# Patient Record
Sex: FEMALE | Race: WHITE | NOT HISPANIC OR LATINO
[De-identification: names, ages, dates, MRNs, and addresses within clinical notes are randomized per-mention and may not be internally consistent; named-entity substitution may affect disease eponyms.]

---

## 2017-02-17 PROBLEM — Z00.00 ENCOUNTER FOR PREVENTIVE HEALTH EXAMINATION: Status: ACTIVE | Noted: 2017-02-17

## 2017-03-24 ENCOUNTER — APPOINTMENT (OUTPATIENT)
Dept: ENDOCRINOLOGY | Facility: CLINIC | Age: 57
End: 2017-03-24

## 2017-03-24 VITALS
OXYGEN SATURATION: 98 % | SYSTOLIC BLOOD PRESSURE: 138 MMHG | WEIGHT: 110 LBS | HEART RATE: 74 BPM | DIASTOLIC BLOOD PRESSURE: 82 MMHG | HEIGHT: 58.2 IN | BODY MASS INDEX: 22.78 KG/M2

## 2017-03-24 DIAGNOSIS — Z82.62 FAMILY HISTORY OF OSTEOPOROSIS: ICD-10-CM

## 2017-03-24 RX ORDER — MULTIVITAMIN
TABLET ORAL
Refills: 0 | Status: ACTIVE | COMMUNITY

## 2017-03-24 RX ORDER — ACETAMINOPHEN 325 MG
5000 TABLET ORAL
Refills: 0 | Status: ACTIVE | COMMUNITY

## 2017-03-24 RX ORDER — MILK THISTLE FRUIT EXTRACT 140 MG
CAPSULE ORAL
Refills: 0 | Status: ACTIVE | COMMUNITY

## 2017-07-25 ENCOUNTER — APPOINTMENT (OUTPATIENT)
Dept: ENDOCRINOLOGY | Facility: CLINIC | Age: 57
End: 2017-07-25

## 2017-07-25 VITALS
WEIGHT: 111 LBS | BODY MASS INDEX: 23.3 KG/M2 | OXYGEN SATURATION: 98 % | DIASTOLIC BLOOD PRESSURE: 82 MMHG | HEART RATE: 68 BPM | HEIGHT: 58 IN | SYSTOLIC BLOOD PRESSURE: 130 MMHG

## 2017-07-26 LAB
ALBUMIN SERPL ELPH-MCNC: 4.8 G/DL
ALP BLD-CCNC: 61 U/L
ALT SERPL-CCNC: 11 U/L
ANION GAP SERPL CALC-SCNC: 14 MMOL/L
AST SERPL-CCNC: 16 U/L
BILIRUB SERPL-MCNC: 0.3 MG/DL
BUN SERPL-MCNC: 14 MG/DL
CALCIUM SERPL-MCNC: 10.3 MG/DL
CHLORIDE SERPL-SCNC: 100 MMOL/L
CO2 SERPL-SCNC: 29 MMOL/L
CREAT SERPL-MCNC: 0.66 MG/DL
GLUCOSE SERPL-MCNC: 94 MG/DL
POTASSIUM SERPL-SCNC: 4.6 MMOL/L
PROT SERPL-MCNC: 7 G/DL
SODIUM SERPL-SCNC: 143 MMOL/L

## 2017-08-14 ENCOUNTER — RX RENEWAL (OUTPATIENT)
Age: 57
End: 2017-08-14

## 2017-08-14 DIAGNOSIS — N95.2 POSTMENOPAUSAL ATROPHIC VAGINITIS: ICD-10-CM

## 2017-11-03 ENCOUNTER — LABORATORY RESULT (OUTPATIENT)
Age: 57
End: 2017-11-03

## 2017-11-03 ENCOUNTER — APPOINTMENT (OUTPATIENT)
Dept: OBGYN | Facility: CLINIC | Age: 57
End: 2017-11-03
Payer: COMMERCIAL

## 2017-11-03 VITALS
DIASTOLIC BLOOD PRESSURE: 79 MMHG | WEIGHT: 104 LBS | BODY MASS INDEX: 21.83 KG/M2 | HEART RATE: 71 BPM | HEIGHT: 58 IN | SYSTOLIC BLOOD PRESSURE: 139 MMHG

## 2017-11-03 DIAGNOSIS — N95.0 POSTMENOPAUSAL BLEEDING: ICD-10-CM

## 2017-11-03 DIAGNOSIS — R31.9 HEMATURIA, UNSPECIFIED: ICD-10-CM

## 2017-11-03 LAB
BLOOD URINE: NORMAL
KETONES URINE: NORMAL
LEUKOCYTE ESTERASE URINE: NORMAL
NITRITE URINE: NORMAL
PROTEIN URINE: NORMAL

## 2017-11-03 PROCEDURE — 76856 US EXAM PELVIC COMPLETE: CPT

## 2017-11-03 PROCEDURE — 99214 OFFICE O/P EST MOD 30 MIN: CPT | Mod: 25

## 2017-11-06 LAB — BACTERIA UR CULT: ABNORMAL

## 2017-12-01 ENCOUNTER — TRANSCRIPTION ENCOUNTER (OUTPATIENT)
Age: 57
End: 2017-12-01

## 2017-12-29 ENCOUNTER — MESSAGE (OUTPATIENT)
Age: 57
End: 2017-12-29

## 2018-01-03 ENCOUNTER — TRANSCRIPTION ENCOUNTER (OUTPATIENT)
Age: 58
End: 2018-01-03

## 2018-02-27 ENCOUNTER — RX RENEWAL (OUTPATIENT)
Age: 58
End: 2018-02-27

## 2018-03-06 ENCOUNTER — TRANSCRIPTION ENCOUNTER (OUTPATIENT)
Age: 58
End: 2018-03-06

## 2018-03-26 ENCOUNTER — APPOINTMENT (OUTPATIENT)
Dept: ENDOCRINOLOGY | Facility: CLINIC | Age: 58
End: 2018-03-26
Payer: COMMERCIAL

## 2018-03-26 VITALS — WEIGHT: 104 LBS | BODY MASS INDEX: 21.25 KG/M2 | HEIGHT: 58.5 IN

## 2018-03-26 PROCEDURE — 99214 OFFICE O/P EST MOD 30 MIN: CPT | Mod: 25

## 2018-03-26 PROCEDURE — ZZZZZ: CPT

## 2018-03-26 PROCEDURE — 77080 DXA BONE DENSITY AXIAL: CPT

## 2018-03-26 RX ORDER — RISEDRONATE SODIUM 129; 21 MG/1; MG/1
150 TABLET, FILM COATED ORAL
Refills: 0 | Status: DISCONTINUED | COMMUNITY
End: 2018-03-26

## 2018-03-26 RX ORDER — ESTRADIOL 10 UG/1
10 INSERT VAGINAL
Refills: 0 | Status: DISCONTINUED | COMMUNITY
End: 2018-03-26

## 2018-03-27 LAB
25(OH)D3 SERPL-MCNC: 90.7 NG/ML
ALBUMIN SERPL ELPH-MCNC: 4.5 G/DL
ALP BLD-CCNC: 63 U/L
ALT SERPL-CCNC: 12 U/L
ANION GAP SERPL CALC-SCNC: 11 MMOL/L
AST SERPL-CCNC: 16 U/L
BILIRUB SERPL-MCNC: 0.4 MG/DL
BUN SERPL-MCNC: 14 MG/DL
CALCIUM SERPL-MCNC: 9.4 MG/DL
CALCIUM SERPL-MCNC: 9.4 MG/DL
CHLORIDE SERPL-SCNC: 101 MMOL/L
CO2 SERPL-SCNC: 27 MMOL/L
CREAT SERPL-MCNC: 0.64 MG/DL
GLUCOSE SERPL-MCNC: 81 MG/DL
PARATHYROID HORMONE INTACT: 30 PG/ML
POTASSIUM SERPL-SCNC: 4.1 MMOL/L
PROT SERPL-MCNC: 7 G/DL
SODIUM SERPL-SCNC: 139 MMOL/L

## 2018-04-04 ENCOUNTER — MEDICATION RENEWAL (OUTPATIENT)
Age: 58
End: 2018-04-04

## 2018-04-23 ENCOUNTER — APPOINTMENT (OUTPATIENT)
Dept: ENDOCRINOLOGY | Facility: CLINIC | Age: 58
End: 2018-04-23
Payer: COMMERCIAL

## 2018-04-23 PROCEDURE — 96372 THER/PROPH/DIAG INJ SC/IM: CPT

## 2018-04-23 RX ORDER — DENOSUMAB 60 MG/ML
60 INJECTION SUBCUTANEOUS
Qty: 0 | Refills: 0 | Status: COMPLETED | OUTPATIENT
Start: 2018-04-23

## 2018-04-23 RX ADMIN — DENOSUMAB 0 MG/ML: 60 INJECTION SUBCUTANEOUS at 00:00

## 2018-10-03 ENCOUNTER — MEDICATION RENEWAL (OUTPATIENT)
Age: 58
End: 2018-10-03

## 2018-10-29 ENCOUNTER — APPOINTMENT (OUTPATIENT)
Dept: ENDOCRINOLOGY | Facility: CLINIC | Age: 58
End: 2018-10-29
Payer: COMMERCIAL

## 2018-10-29 PROCEDURE — 96401 CHEMO ANTI-NEOPL SQ/IM: CPT

## 2018-10-29 RX ADMIN — DENOSUMAB 0 MG/ML: 60 INJECTION SUBCUTANEOUS at 00:00

## 2018-10-31 RX ORDER — DENOSUMAB 60 MG/ML
60 INJECTION SUBCUTANEOUS
Qty: 1 | Refills: 0 | Status: COMPLETED | OUTPATIENT
Start: 2018-10-29

## 2018-12-10 ENCOUNTER — MEDICATION RENEWAL (OUTPATIENT)
Age: 58
End: 2018-12-10

## 2019-01-31 ENCOUNTER — RX RENEWAL (OUTPATIENT)
Age: 59
End: 2019-01-31

## 2019-03-18 ENCOUNTER — ASOB RESULT (OUTPATIENT)
Age: 59
End: 2019-03-18

## 2019-03-18 ENCOUNTER — APPOINTMENT (OUTPATIENT)
Dept: OBGYN | Facility: CLINIC | Age: 59
End: 2019-03-18
Payer: COMMERCIAL

## 2019-03-18 ENCOUNTER — APPOINTMENT (OUTPATIENT)
Age: 59
End: 2019-03-18
Payer: COMMERCIAL

## 2019-03-18 VITALS
HEIGHT: 58.5 IN | SYSTOLIC BLOOD PRESSURE: 136 MMHG | DIASTOLIC BLOOD PRESSURE: 80 MMHG | HEART RATE: 72 BPM | BODY MASS INDEX: 22.07 KG/M2 | WEIGHT: 108 LBS

## 2019-03-18 LAB
BILIRUB UR QL STRIP: NEGATIVE
CLARITY UR: CLEAR
COLLECTION METHOD: NORMAL
GLUCOSE UR-MCNC: NEGATIVE
HCG UR QL: 0.2 EU/DL
HGB UR QL STRIP.AUTO: NEGATIVE
KETONES UR-MCNC: NEGATIVE
LEUKOCYTE ESTERASE UR QL STRIP: NORMAL
NITRITE UR QL STRIP: NEGATIVE
PH UR STRIP: 5.5
PROT UR STRIP-MCNC: NEGATIVE
SP GR UR STRIP: 1.02

## 2019-03-18 PROCEDURE — 99396 PREV VISIT EST AGE 40-64: CPT

## 2019-03-18 PROCEDURE — 76857 US EXAM PELVIC LIMITED: CPT | Mod: 59

## 2019-03-18 PROCEDURE — 81002 URINALYSIS NONAUTO W/O SCOPE: CPT

## 2019-03-18 PROCEDURE — 76830 TRANSVAGINAL US NON-OB: CPT

## 2019-03-18 RX ORDER — RISEDRONATE SODIUM 150 MG/1
150 TABLET, FILM COATED ORAL
Qty: 3 | Refills: 3 | Status: DISCONTINUED | COMMUNITY
Start: 2017-03-24 | End: 2019-03-18

## 2019-03-18 RX ORDER — NITROFURANTOIN (MONOHYDRATE/MACROCRYSTALS) 25; 75 MG/1; MG/1
100 CAPSULE ORAL
Qty: 10 | Refills: 1 | Status: DISCONTINUED | COMMUNITY
Start: 2017-12-29 | End: 2019-03-18

## 2019-03-18 NOTE — HISTORY OF PRESENT ILLNESS
[Good] : being in good health [1 Year Ago] : 1 year ago [Healthy Diet] : a healthy diet [Regular Exercise] : regular exercise [Current] : risk screening reviewed and current [Postmenopausal] : is postmenopausal [Pregnancy History] : pregnancy history: [Up to Date] : up to date with ~his/her~ STD screening [Currently In Menopause] : currently in menopause [Sexually Active] : is sexually active [Menopause Age: ____] : age at menopause was [unfilled] [Monogamous] : is monogamous [Male ___] : [unfilled] male [NA] : N/A [Weight Concerns] : no concerns with her weight [Menstrual Problems] : reports normal menses [Fever] : no fever [Nausea] : no nausea [Vomiting] : no vomiting [Diarrhea] : no diarrhea [Vaginal Bleeding] : no vaginal bleeding [Pelvic Pressure] : no pelvic pressure [Dysuria] : no dysuria [FreeTextEntry8] : one episode of PMB. [FreeTextEntry9] : none [Hot Flashes] : no hot flashes [Night Sweats] : no night sweats [Vaginal Itching] : no vaginal itching [Dyspareunia] : no dyspareunia [Mood Changes] : no mood changes [Experiencing Menopausal Sxs] : not experiencing menopausal symptoms [Contraception] : does not use contraception

## 2019-03-18 NOTE — PHYSICAL EXAM
[Awake] : awake [Alert] : alert [Soft] : soft [Oriented x3] : oriented to person, place, and time [No Bleeding] : there was no active vaginal bleeding [Normal] : uterus [Uterine Adnexae] : were not tender and not enlarged [Acute Distress] : no acute distress [Mass] : no breast mass [Nipple Discharge] : no nipple discharge [Axillary LAD] : no axillary lymphadenopathy [Tender] : non tender

## 2019-03-20 LAB — HPV HIGH+LOW RISK DNA PNL CVX: NOT DETECTED

## 2019-03-25 LAB — CYTOLOGY CVX/VAG DOC THIN PREP: NORMAL

## 2019-03-26 ENCOUNTER — TRANSCRIPTION ENCOUNTER (OUTPATIENT)
Age: 59
End: 2019-03-26

## 2019-05-06 ENCOUNTER — APPOINTMENT (OUTPATIENT)
Dept: ENDOCRINOLOGY | Facility: CLINIC | Age: 59
End: 2019-05-06
Payer: COMMERCIAL

## 2019-05-06 VITALS
WEIGHT: 108 LBS | OXYGEN SATURATION: 98 % | HEIGHT: 58.5 IN | DIASTOLIC BLOOD PRESSURE: 74 MMHG | HEART RATE: 81 BPM | SYSTOLIC BLOOD PRESSURE: 130 MMHG | BODY MASS INDEX: 22.07 KG/M2

## 2019-05-06 PROCEDURE — ZZZZZ: CPT

## 2019-05-06 PROCEDURE — 96401 CHEMO ANTI-NEOPL SQ/IM: CPT

## 2019-05-06 PROCEDURE — 77080 DXA BONE DENSITY AXIAL: CPT

## 2019-05-06 PROCEDURE — 99214 OFFICE O/P EST MOD 30 MIN: CPT | Mod: 25

## 2019-05-06 RX ORDER — DENOSUMAB 60 MG/ML
60 INJECTION SUBCUTANEOUS
Qty: 1 | Refills: 0 | Status: COMPLETED | OUTPATIENT
Start: 2019-05-06

## 2019-05-06 RX ADMIN — DENOSUMAB 60 MG/ML: 60 INJECTION SUBCUTANEOUS at 00:00

## 2019-05-06 NOTE — ASSESSMENT
[Bisphosphonates] : The patient was instructed to take bisphosphonates on an empty stomach with a full glass of water,and wait at least 30 minutes before eating or lying down [FreeTextEntry1] : 59 year-old female with severe osteoporosis \par \par Pt presented with progressive bone loss at the spine. Began Actonel. Tolerated well  except c/o cough after each dose. Pt was advised that she is at increased risk for future fxs due to osteoporosis. Options of medical therapy were discussed in great detail. Pt requested Prolia. First dose April 2018. Taking correctly, tolerating well, no ONJ or thigh pain. No interval fx. c/o jaw pain for a few weeks, dental exam neg. BMD 5/2019 indicates stability in all sites. \par \par Of note, occ feels palpitations towards the end of the day, previously wore heart monitor to rule out Afib. Will visit Dr. Jimenez again. \par \par Can see continued increase with Prolia even out to 10 years. I would not recommend more aggressive therapy at this time to include Tymlos, Forteo, Tanner. Continue with Prolia [facility]. \par \par I request labs sent out today. f/u in 6 mons.  [Denosumab Therapy] : Risks  and benefits of denosumab therapy were discussed with the patient including eczema, cellulitis, osteonecrosis of the jaw and atypical femur fractures

## 2019-05-06 NOTE — PHYSICAL EXAM
[Alert] : alert [No Acute Distress] : no acute distress [Well Nourished] : well nourished [Well Developed] : well developed [EOMI] : extra ocular movement intact [Normal Sclera/Conjunctiva] : normal sclera/conjunctiva [No Proptosis] : no proptosis [Normal Oropharynx] : the oropharynx was normal [Thyroid Not Enlarged] : the thyroid was not enlarged [No Thyroid Nodules] : there were no palpable thyroid nodules [No Accessory Muscle Use] : no accessory muscle use [No Respiratory Distress] : no respiratory distress [Clear to Auscultation] : lungs were clear to auscultation bilaterally [Normal Rate] : heart rate was normal  [Normal S1, S2] : normal S1 and S2 [Regular Rhythm] : with a regular rhythm [Normal Bowel Sounds] : normal bowel sounds [Soft] : abdomen soft [Not Tender] : non-tender [Not Distended] : not distended [Post Cervical Nodes] : posterior cervical nodes [Anterior Cervical Nodes] : anterior cervical nodes [Axillary Nodes] : axillary nodes [No Spinal Tenderness] : no spinal tenderness [Normal] : normal and non tender [No Stigmata of Cushings Syndrome] : no stigmata of cushings syndrome [Spine Straight] : spine straight [Normal Gait] : normal gait [Normal Strength/Tone] : muscle strength and tone were normal [No Rash] : no rash [Normal Reflexes] : deep tendon reflexes were 2+ and symmetric [No Tremors] : no tremors [Oriented x3] : oriented to person, place, and time [Acanthosis Nigricans] : no acanthosis nigricans

## 2019-05-06 NOTE — PROCEDURE
[FreeTextEntry1] : Bone mineral density: 05/06/2019 \par Indication: vs. 2018, assess response to medication \par Spine: -3.5 osteoporosis, no significant change \par Total hip: -2.3 osteopenia, no significant change \par Femoral neck: -3.0 osteoporosis, no significant change \par Proximal radius: -2.0 osteopenia, no significant change \par \par Bone mineral density March 26, 2018\par Indication assess response to medication\par Spine -3.6, osteoporosis, prior,-4.0\par Total hip -2.3, osteopenia\par Femoral neck -2.7, osteoporosis, no significant change\par Right total hip -2.3, osteopenia\par Right femoral neck -2.9, osteoporosis\par Left proximal radius -2.4, osteopenia, no prior\par \par \par i

## 2019-05-06 NOTE — END OF VISIT
[FreeTextEntry3] : I, Mary Small, authored this note working as a medical scribe for Dr. Cross.  05/06/2019. 11:15AM. \par This note was authored by Mary Small working as medical scribe for me. I have reviewed, edited, and revised the note as needed. I am in agreement with the exam findings, imaging findings, and treatment plan.  Kleber Cross MD

## 2019-05-06 NOTE — HISTORY OF PRESENT ILLNESS
[Risedronate (Actonel)] : Risedronate [Prolia (Denosumab)] : Prolia (Denosumab) [Family History of Osteoporosis] : family history of osteoporosis [Regular Dental Follow-Up] : regular dental follow-up [FreeTextEntry1] : f/u 59 year-old female with osteoporosis\par \par Long h/o low bone density. Spine bone density 2007; -3.0. Repeat 2012:-3.4. Repeat  2017 spine -4.0 femoral neck -2.9, total hip -2.3. Pt had previously declined treatment for osteoporosis. No h/o fx. No history of unusual or secondary causes for osteoporosis Began Actonel monthly, took correctly. Had minor side effects, did not have with repeat doses. c/o cough after dose. Transitioned to Prolia April 2018. Taking correctly, tolerating well, no thigh pain. Last DDS within 6 mons. No ONJ. No interval fx. \par   [Disordered Eating] : no past or present history of disordered eating [Taking Steroids] : no past or present history of taking steroids [Kidney Stones] : no history of kidney stones [Family History of Hip Fracture] : no family history of hip fracture [Hyperparathyroidism] : no hyperparathyroidism [History of Radiation Therapy] : no history of radiation therapy [History of Blood Clots] : no history of blood clots [Previous Fragility Fracture] : no previous fragility fracture

## 2019-05-07 LAB
ALBUMIN SERPL ELPH-MCNC: 4.8 G/DL
ALP BLD-CCNC: 57 U/L
ALT SERPL-CCNC: 12 U/L
ANION GAP SERPL CALC-SCNC: 12 MMOL/L
AST SERPL-CCNC: 16 U/L
BILIRUB SERPL-MCNC: 0.3 MG/DL
BUN SERPL-MCNC: 19 MG/DL
CALCIUM SERPL-MCNC: 10.1 MG/DL
CHLORIDE SERPL-SCNC: 97 MMOL/L
CO2 SERPL-SCNC: 28 MMOL/L
CREAT SERPL-MCNC: 0.6 MG/DL
GLUCOSE SERPL-MCNC: 80 MG/DL
POTASSIUM SERPL-SCNC: 4.1 MMOL/L
PROT SERPL-MCNC: 6.9 G/DL
SODIUM SERPL-SCNC: 137 MMOL/L

## 2019-10-31 ENCOUNTER — MEDICATION RENEWAL (OUTPATIENT)
Age: 59
End: 2019-10-31

## 2019-10-31 RX ORDER — DENOSUMAB 60 MG/ML
60 INJECTION SUBCUTANEOUS
Qty: 1 | Refills: 1 | Status: DISCONTINUED | COMMUNITY
Start: 2018-04-04 | End: 2019-10-31

## 2019-11-18 ENCOUNTER — APPOINTMENT (OUTPATIENT)
Dept: ENDOCRINOLOGY | Facility: CLINIC | Age: 59
End: 2019-11-18
Payer: COMMERCIAL

## 2019-11-18 VITALS
SYSTOLIC BLOOD PRESSURE: 120 MMHG | DIASTOLIC BLOOD PRESSURE: 80 MMHG | WEIGHT: 108 LBS | BODY MASS INDEX: 22.07 KG/M2 | OXYGEN SATURATION: 98 % | HEART RATE: 75 BPM | HEIGHT: 58.5 IN

## 2019-11-18 PROCEDURE — 96401 CHEMO ANTI-NEOPL SQ/IM: CPT

## 2019-11-18 PROCEDURE — 99214 OFFICE O/P EST MOD 30 MIN: CPT | Mod: 25

## 2019-11-18 RX ORDER — DENOSUMAB 60 MG/ML
60 INJECTION SUBCUTANEOUS
Qty: 1 | Refills: 0 | Status: COMPLETED | OUTPATIENT
Start: 2019-11-18

## 2019-11-18 RX ADMIN — DENOSUMAB 0 MG/ML: 60 INJECTION SUBCUTANEOUS at 00:00

## 2019-11-18 NOTE — HISTORY OF PRESENT ILLNESS
[Risedronate (Actonel)] : Risedronate [Prolia (Denosumab)] : Prolia (Denosumab) [Family History of Osteoporosis] : family history of osteoporosis [Regular Dental Follow-Up] : regular dental follow-up [FreeTextEntry1] : f/u 59 year-old female with osteoporosis\par \par Long h/o low bone density. Spine bone density 2007; -3.0. Repeat 2012:-3.4. Repeat  2017 spine -4.0 femoral neck -2.9, total hip -2.3. Pt had previously declined treatment for osteoporosis. No h/o fx. No history of unusual or secondary causes for osteoporosis Began Actonel monthly in 2017, took correctly. Had minor side effects, did not have with repeat doses. c/o cough after dose. Transitioned to Prolia April 2018. Taking correctly, tolerating well, no thigh pain. Last DDS within 6 mons. No ONJ. No interval fx. Last bone density from May 2019 showed osteoporosis in spine -3.5 and femoral neck -3.0 , osteopenia of total hip -2.3 and distal radius -2.0; no significant change. \par \par Had recent blood work with PCP: Dr. Lauro Cantrell and it was normal as per patient.  [Disordered Eating] : no past or present history of disordered eating [Taking Steroids] : no past or present history of taking steroids [Family History of Hip Fracture] : no family history of hip fracture [Kidney Stones] : no history of kidney stones [History of Radiation Therapy] : no history of radiation therapy [Hyperparathyroidism] : no hyperparathyroidism [History of Blood Clots] : no history of blood clots [Previous Fragility Fracture] : no previous fragility fracture

## 2019-11-18 NOTE — ASSESSMENT
[Denosumab Therapy] : Risks  and benefits of denosumab therapy were discussed with the patient including eczema, cellulitis, osteonecrosis of the jaw and atypical femur fractures [Bisphosphonates] : The patient was instructed to take bisphosphonates on an empty stomach with a full glass of water,and wait at least 30 minutes before eating or lying down [FreeTextEntry1] : 59 year-old female with severe osteoporosis \par \par Pt presented with progressive bone loss at the spine. Began Actonel. Tolerated well  except c/o cough after each dose. Pt was advised that she is at increased risk for future fxs due to osteoporosis. Options of medical therapy were discussed in great detail. Pt requested Prolia. First dose April 2018. Taking correctly, tolerating well, no ONJ or thigh pain. No interval fx. BMD 5/2019 indicates stability in all sites. \par \par Can see continued increase with Prolia even out to 10 years. I would not recommend more aggressive therapy at this time to include Tymlos, Forteo, Tanner. \par Continue with [facility]. \par \par f/u in 6 mons. \par \par Plan discussed with Dr. Cross.

## 2019-11-18 NOTE — PHYSICAL EXAM
[Alert] : alert [No Acute Distress] : no acute distress [Well Nourished] : well nourished [Well Developed] : well developed [Normal Sclera/Conjunctiva] : normal sclera/conjunctiva [Normal Oropharynx] : the oropharynx was normal [Thyroid Not Enlarged] : the thyroid was not enlarged [No Thyroid Nodules] : there were no palpable thyroid nodules [No Respiratory Distress] : no respiratory distress [No Accessory Muscle Use] : no accessory muscle use [Clear to Auscultation] : lungs were clear to auscultation bilaterally [Normal Rate] : heart rate was normal  [Normal S1, S2] : normal S1 and S2 [Regular Rhythm] : with a regular rhythm [Normal Bowel Sounds] : normal bowel sounds [Not Tender] : non-tender [Soft] : abdomen soft [Not Distended] : not distended [Anterior Cervical Nodes] : anterior cervical nodes [Normal] : normal and non tender [No Spinal Tenderness] : no spinal tenderness [Spine Straight] : spine straight [No Stigmata of Cushings Syndrome] : no stigmata of cushings syndrome [Normal Gait] : normal gait [No Rash] : no rash [No Tremors] : no tremors [Oriented x3] : oriented to person, place, and time [Normal Hearing] : hearing was normal [Post Cervical Nodes] : posterior cervical nodes [No Motor Deficits] : the motor exam was normal [Acanthosis Nigricans] : no acanthosis nigricans

## 2019-11-18 NOTE — REVIEW OF SYSTEMS
[Negative] : Cardiovascular [Fatigue] : no fatigue [Recent Weight Gain (___ Lbs)] : no recent weight gain [Chest Pain] : no chest pain [Recent Weight Loss (___ Lbs)] : no recent weight loss [Palpitations] : no palpitations [Shortness Of Breath] : no shortness of breath [Cough] : no cough [Vomiting] : no vomiting was observed [Nausea] : no nausea [Constipation] : no constipation [Abdominal Pain] : no abdominal pain [Diarrhea] : no diarrhea

## 2020-06-01 ENCOUNTER — APPOINTMENT (OUTPATIENT)
Dept: ENDOCRINOLOGY | Facility: CLINIC | Age: 60
End: 2020-06-01
Payer: COMMERCIAL

## 2020-06-01 VITALS
BODY MASS INDEX: 21.86 KG/M2 | DIASTOLIC BLOOD PRESSURE: 80 MMHG | HEART RATE: 83 BPM | WEIGHT: 107 LBS | SYSTOLIC BLOOD PRESSURE: 130 MMHG | OXYGEN SATURATION: 98 % | TEMPERATURE: 98.2 F | HEIGHT: 58.5 IN

## 2020-06-01 PROCEDURE — 96401 CHEMO ANTI-NEOPL SQ/IM: CPT

## 2020-06-01 PROCEDURE — 99213 OFFICE O/P EST LOW 20 MIN: CPT | Mod: 25

## 2020-06-01 RX ORDER — DENOSUMAB 60 MG/ML
60 INJECTION SUBCUTANEOUS
Qty: 1 | Refills: 0 | Status: COMPLETED | OUTPATIENT
Start: 2020-06-01

## 2020-06-01 RX ADMIN — DENOSUMAB 60 MG/ML: 60 INJECTION SUBCUTANEOUS at 00:00

## 2020-06-01 NOTE — PHYSICAL EXAM
[Alert] : alert [Well Nourished] : well nourished [Well Developed] : well developed [Normal Sclera/Conjunctiva] : normal sclera/conjunctiva [No Neck Mass] : no neck mass was observed [PERRL] : pupils equal, round and reactive to light [Supple] : the neck was supple [No Respiratory Distress] : no respiratory distress [Clear to Auscultation] : lungs were clear to auscultation bilaterally [Normal to Percussion] : lungs were normal to percussion [Normal S1, S2] : normal S1 and S2 [Normal Rate] : heart rate was normal [Normal Bowel Sounds] : normal bowel sounds [Regular Rhythm] : with a regular rhythm [Not Tender] : non-tender [Soft] : abdomen soft [No Stigmata of Cushings Syndrome] : no stigmata of Cushings Syndrome [Normal Gait] : normal gait [No Rash] : no rash [Normal Strength/Tone] : muscle strength and tone were normal [No Skin Lesions] : no skin lesions [No Motor Deficits] : the motor exam was normal [No Tremors] : no tremors [Oriented x3] : oriented to person, place, and time [Normal Mood] : the mood was normal [Normal Affect] : the affect was normal

## 2020-06-02 LAB
ALBUMIN SERPL ELPH-MCNC: 4.9 G/DL
ALP BLD-CCNC: 56 U/L
ALT SERPL-CCNC: 13 U/L
ANION GAP SERPL CALC-SCNC: 13 MMOL/L
AST SERPL-CCNC: 16 U/L
BILIRUB SERPL-MCNC: 0.3 MG/DL
BUN SERPL-MCNC: 11 MG/DL
CALCIUM SERPL-MCNC: 9.9 MG/DL
CHLORIDE SERPL-SCNC: 101 MMOL/L
CO2 SERPL-SCNC: 26 MMOL/L
CREAT SERPL-MCNC: 0.61 MG/DL
GLUCOSE SERPL-MCNC: 90 MG/DL
POTASSIUM SERPL-SCNC: 4.6 MMOL/L
PROT SERPL-MCNC: 6.7 G/DL
SODIUM SERPL-SCNC: 140 MMOL/L

## 2020-06-02 NOTE — HISTORY OF PRESENT ILLNESS
[Risedronate (Actonel)] : Risedronate [Prolia (Denosumab)] : Prolia (Denosumab) [Family History of Osteoporosis] : family history of osteoporosis [Regular Dental Follow-Up] : regular dental follow-up [Disordered Eating] : no past or present history of disordered eating [FreeTextEntry1] :  \par Long h/o low bone density. Spine bone density 2007; -3.0. Repeat 2012:-3.4. Repeat  2017 spine -4.0 femoral neck -2.9, total hip -2.3. Pt had previously declined treatment for osteoporosis. No h/o fx. No history of unusual or secondary causes for osteoporosis Began Actonel monthly in 2017, took correctly. Had minor side effects, did not have with repeat doses. c/o cough after dose. Transitioned to Prolia April 2018. Taking correctly, tolerating well, no thigh pain. Last DDS 2/2020, normal and no dental work planned. No ONJ. No interval fx. \par Last bone density from May 2019 showed osteoporosis in spine -3.5 and femoral neck -3.0 , osteopenia of total hip -2.3 and distal radius -2.0; no significant change. \par \par Had blood work with PCP: 11/2019 Dr. Lauro Cantrell [Kidney Stones] : no history of kidney stones [Taking Steroids] : no past or present history of taking steroids [Family History of Hip Fracture] : no family history of hip fracture [Hyperparathyroidism] : no hyperparathyroidism [History of Radiation Therapy] : no history of radiation therapy [History of Blood Clots] : no history of blood clots [Previous Fragility Fracture] : no previous fragility fracture

## 2020-06-02 NOTE — ASSESSMENT
[Denosumab Therapy] : Risks  and benefits of denosumab therapy were discussed with the patient including eczema, cellulitis, osteonecrosis of the jaw and atypical femur fractures [Bisphosphonates] : The patient was instructed to take bisphosphonates on an empty stomach with a full glass of water,and wait at least 30 minutes before eating or lying down [FreeTextEntry1] : 60 year-old female with severe osteoporosis \par \par Pt presented with progressive bone loss at the spine. Began Actonel. Tolerated well  except c/o cough after each dose. Pt was advised that she is at increased risk for future fxs due to osteoporosis. Options of medical therapy were discussed in great detail. Pt requested Prolia. First dose April 2018. Taking correctly, tolerating well, no ONJ or thigh pain. No interval fx. BMD 5/2019 indicates stability in all sites. Repeat one in 5/2021. \par  \par Continue with [facility]. \par Labs:	    [ ]      Today		    [ ] Date _________________\par  [     } give lab slip.      {     } am fasting \par Referral Prescription for:\par   [ ] Ultrasound   \par other radiology     \par Return to doctor in:   [ ] 4 weeks   [ ]  3 months   [ ]   4 months   [x ]    6 months   [ ]    1 year\par Return Appointment for:\par [x ]Prolia     \par  \par [ x] Bone Density/DEXA        [ ]  Next available        [ ]    1 month        [ x] Next visit      [ ]    ___________\par \par Plan discussed with Dr. Cross.

## 2020-06-02 NOTE — REVIEW OF SYSTEMS
[Negative] : Constitutional [All other systems negative] : All other systems negative [Neck Pain] : no neck pain [Dysphagia] : no dysphagia [Chest Pain] : no chest pain [Dysphonia] : no dysphonia [Palpitations] : no palpitations [Cough] : no cough [Shortness Of Breath] : no shortness of breath [Nausea] : no nausea [Constipation] : no constipation [Vomiting] : no vomiting [Polyuria] : no polyuria [Diarrhea] : no diarrhea [Dysuria] : no dysuria [Dizziness] : no dizziness [Headaches] : no headaches [Depression] : no depression [Tremors] : no tremors [Heat Intolerance] : no heat intolerance [Cold Intolerance] : no cold intolerance [Anxiety] : no anxiety

## 2020-06-02 NOTE — REASON FOR VISIT
[Follow-Up: _____] : a [unfilled] follow-up visit [Follow - Up] : a follow-up visit [Osteoporosis] : osteoporosis

## 2020-10-12 ENCOUNTER — APPOINTMENT (OUTPATIENT)
Dept: OBGYN | Facility: CLINIC | Age: 60
End: 2020-10-12
Payer: COMMERCIAL

## 2020-10-12 ENCOUNTER — APPOINTMENT (OUTPATIENT)
Dept: ANTEPARTUM | Facility: CLINIC | Age: 60
End: 2020-10-12

## 2020-10-12 VITALS
HEART RATE: 79 BPM | WEIGHT: 107 LBS | DIASTOLIC BLOOD PRESSURE: 82 MMHG | BODY MASS INDEX: 21.86 KG/M2 | HEIGHT: 58.5 IN | SYSTOLIC BLOOD PRESSURE: 160 MMHG

## 2020-10-12 DIAGNOSIS — N60.19 DIFFUSE CYSTIC MASTOPATHY OF UNSPECIFIED BREAST: ICD-10-CM

## 2020-10-12 LAB
BLOOD URINE: NORMAL
GLUCOSE QUALITATIVE U: NORMAL
KETONES URINE: NORMAL
LEUKOCYTE ESTERASE URINE: NORMAL
NITRITE URINE: NORMAL
PROTEIN URINE: NORMAL

## 2020-10-12 PROCEDURE — 99396 PREV VISIT EST AGE 40-64: CPT

## 2020-10-12 RX ORDER — NEOMYCIN AND POLYMYXIN B SULFATES AND DEXAMETHASONE 3.5; 10000; 1 MG/G; [IU]/G; MG/G
3.5-10000-0.1 OINTMENT OPHTHALMIC
Qty: 4 | Refills: 0 | Status: COMPLETED | COMMUNITY
Start: 2020-05-19

## 2020-10-12 RX ORDER — SODIUM PICOSULFATE, MAGNESIUM OXIDE, AND ANHYDROUS CITRIC ACID 10; 3.5; 12 MG/160ML; G/160ML; G/160ML
10-3.5-12 MG-GM LIQUID ORAL
Qty: 320 | Refills: 0 | Status: ACTIVE | COMMUNITY
Start: 2020-06-11

## 2020-10-12 NOTE — HISTORY OF PRESENT ILLNESS
[FreeTextEntry1] : Patient with history of PMB.\par Also with Osteoporosis. [Patient reported mammogram was normal] : Patient reported mammogram was normal [Patient reported breast sonogram was normal] : Patient reported breast sonogram was normal [Patient reported PAP Smear was normal] : Patient reported PAP Smear was normal [Patient reported bone density results were abnormal] : Patient reported bone density results were abnormal [HIV test declined] : HIV test declined [Syphilis test declined] : Syphilis test declined [Gonorrhea test declined] : Gonorrhea test declined [Chlamydia test declined] : Chlamydia test declined [Trichomonas test declined] : Trichomonas test declined [HPV test declined] : HPV test declined [Hepatitis B test declined] : Hepatitis B test declined [Hepatitis C test declined] : Hepatitis C test declined [postmenopausal] : postmenopausal [N] : Patient reports normal menses [Y] : Positive pregnancy history [TextBox_4] : Doing well.\par No complaints. [Mammogramdate] : 08/19 [BreastSonogramDate] : 08/19 [PapSmeardate] : 03/19 [BoneDensityDate] : 05/19 [TextBox_37] : osteoporosis. [TextBox_7] : none [TextBox_11] : post menopausal. [Diffused Pain] : diffused pain [TextBox_17] : fibrocystic. [unknown] : Patient is unsure of the date of her LMP [Currently Active] : currently active [Men] : men [Vaginal] : vaginal [No] : No [Patient refuses STI testing] : Patient refuses STI testing

## 2020-10-12 NOTE — DISCUSSION/SUMMARY
[FreeTextEntry1] : Patient doing well.\par Pap.\par Mammo/sono ordered.\par Under care of RENETTA Cross for Osteoporosis on meds.\par \par Routine follow up.

## 2020-10-12 NOTE — PHYSICAL EXAM
[Appropriately responsive] : appropriately responsive [Alert] : alert [No Acute Distress] : no acute distress [No Lymphadenopathy] : no lymphadenopathy [Regular Rate Rhythm] : regular rate rhythm [No Murmurs] : no murmurs [Clear to Auscultation B/L] : clear to auscultation bilaterally [Soft] : soft [Non-tender] : non-tender [Non-distended] : non-distended [No HSM] : No HSM [No Lesions] : no lesions [No Mass] : no mass [Oriented x3] : oriented x3 [Examination Of The Breasts] : a normal appearance [Breast Palpation Diffuse Fibrous Tissue Bilateral] : fibrocystic changes [No Masses] : no breast masses were palpable [Labia Majora] : normal [Labia Minora] : normal [Normal] : normal [Uterine Adnexae] : normal

## 2020-10-13 LAB — HPV HIGH+LOW RISK DNA PNL CVX: NOT DETECTED

## 2020-10-19 LAB — CYTOLOGY CVX/VAG DOC THIN PREP: ABNORMAL

## 2020-11-17 ENCOUNTER — RX RENEWAL (OUTPATIENT)
Age: 60
End: 2020-11-17

## 2020-12-07 ENCOUNTER — APPOINTMENT (OUTPATIENT)
Dept: ENDOCRINOLOGY | Facility: CLINIC | Age: 60
End: 2020-12-07
Payer: COMMERCIAL

## 2020-12-07 VITALS
HEART RATE: 82 BPM | TEMPERATURE: 98.3 F | BODY MASS INDEX: 21.66 KG/M2 | WEIGHT: 106 LBS | HEIGHT: 58.6 IN | OXYGEN SATURATION: 98 % | SYSTOLIC BLOOD PRESSURE: 150 MMHG | DIASTOLIC BLOOD PRESSURE: 72 MMHG

## 2020-12-07 PROCEDURE — ZZZZZ: CPT

## 2020-12-07 PROCEDURE — 99213 OFFICE O/P EST LOW 20 MIN: CPT | Mod: 25

## 2020-12-07 PROCEDURE — 77080 DXA BONE DENSITY AXIAL: CPT

## 2020-12-07 PROCEDURE — 99072 ADDL SUPL MATRL&STAF TM PHE: CPT

## 2020-12-07 PROCEDURE — 96401 CHEMO ANTI-NEOPL SQ/IM: CPT

## 2020-12-07 RX ORDER — MULTIVIT-MIN/FOLIC/VIT K/LYCOP 400-300MCG
25 MCG TABLET ORAL DAILY
Refills: 0 | Status: ACTIVE | COMMUNITY
Start: 2020-12-07

## 2020-12-07 RX ORDER — DENOSUMAB 60 MG/ML
60 INJECTION SUBCUTANEOUS
Qty: 1 | Refills: 0 | Status: COMPLETED | OUTPATIENT
Start: 2020-12-07

## 2020-12-07 RX ADMIN — DENOSUMAB 60 MG/ML: 60 INJECTION SUBCUTANEOUS at 00:00

## 2020-12-07 NOTE — PHYSICAL EXAM
[Alert] : alert [Well Nourished] : well nourished [Well Developed] : well developed [Normal Sclera/Conjunctiva] : normal sclera/conjunctiva [PERRL] : pupils equal, round and reactive to light [No Neck Mass] : no neck mass was observed [Supple] : the neck was supple [Clear to Auscultation] : lungs were clear to auscultation bilaterally [Normal to Percussion] : lungs were normal to percussion [Normal S1, S2] : normal S1 and S2 [Normal Rate] : heart rate was normal [Regular Rhythm] : with a regular rhythm [Normal Bowel Sounds] : normal bowel sounds [Not Tender] : non-tender [Soft] : abdomen soft [No Stigmata of Cushings Syndrome] : no stigmata of Cushings Syndrome [Normal Gait] : normal gait [Normal Strength/Tone] : muscle strength and tone were normal [No Rash] : no rash [No Skin Lesions] : no skin lesions [No Motor Deficits] : the motor exam was normal [No Tremors] : no tremors [Oriented x3] : oriented to person, place, and time [Normal Affect] : the affect was normal [Normal Mood] : the mood was normal

## 2020-12-08 LAB
25(OH)D3 SERPL-MCNC: 73.6 NG/ML
ALBUMIN SERPL ELPH-MCNC: 4.9 G/DL
ALP BLD-CCNC: 65 U/L
ALT SERPL-CCNC: 13 U/L
ANION GAP SERPL CALC-SCNC: 10 MMOL/L
AST SERPL-CCNC: 17 U/L
BILIRUB SERPL-MCNC: 0.4 MG/DL
BUN SERPL-MCNC: 10 MG/DL
CALCIUM SERPL-MCNC: 10.2 MG/DL
CALCIUM SERPL-MCNC: 10.2 MG/DL
CHLORIDE SERPL-SCNC: 100 MMOL/L
CO2 SERPL-SCNC: 28 MMOL/L
CREAT SERPL-MCNC: 0.65 MG/DL
GLUCOSE SERPL-MCNC: 88 MG/DL
PARATHYROID HORMONE INTACT: 23 PG/ML
PHOSPHATE SERPL-MCNC: 3.9 MG/DL
POTASSIUM SERPL-SCNC: 4.1 MMOL/L
PROT SERPL-MCNC: 7.3 G/DL
SODIUM SERPL-SCNC: 138 MMOL/L

## 2020-12-08 NOTE — REVIEW OF SYSTEMS
[All other systems negative] : All other systems negative [Negative] : Endocrine [Dysphagia] : no dysphagia [Neck Pain] : no neck pain [Dysphonia] : no dysphonia [Chest Pain] : no chest pain [Palpitations] : no palpitations [Shortness Of Breath] : no shortness of breath [Cough] : no cough [Polyuria] : no polyuria [Dysuria] : no dysuria [Dizziness] : no dizziness

## 2020-12-08 NOTE — ASSESSMENT
[Denosumab Therapy] : Risks  and benefits of denosumab therapy were discussed with the patient including eczema, cellulitis, osteonecrosis of the jaw and atypical femur fractures [Bisphosphonates] : The patient was instructed to take bisphosphonates on an empty stomach with a full glass of water,and wait at least 30 minutes before eating or lying down [FreeTextEntry1] : 60 year-old female with severe osteoporosis \par \par Pt presented with progressive bone loss at the spine. Began Actonel. Tolerated well  except c/o cough after each dose. Pt was advised that she is at increased risk for future fxs due to osteoporosis. Options of medical therapy were discussed in great detail. Pt requested Prolia. First dose April 2018. Taking correctly, tolerating well, no ONJ or thigh pain. No interval fx. BMD 5/2019 indicates stability in all sites. BMD 12/7/2020 shows improvement in spine and stability at hip, however there is significant bone loss at prox radius. Unclear reason for significant bone loss at radius. Will rule out hyperparathyroidism - will check PTH level, CMP, phosphorus. Will check CTx level.  Options of therapy reviewed.  Anabolic therapy such as Forteo would be unlikely to improve proximal radius bone density.\par Continue with Prolia [facility]. \par Repeat BMD at radius in 6 months.\par \par Plan discussed with Dr. Cross.

## 2020-12-08 NOTE — PROCEDURE
[FreeTextEntry1] : Bone density 12/7/2020\par indication: vs 2019\par spine -3.1 osteoporosis, +5.1%\par total hip -2.3, osteopenia, no significant change\par femoral neck -2.9 osteoporosis, no significant change\par distal radius -3.0, osteoporosis, -9.8%\par \par Bone mineral density: 05/06/2019 \par Indication: vs. 2018, assess response to medication \par Spine: -3.5 osteoporosis, no significant change \par Total hip: -2.3 osteopenia, no significant change \par Femoral neck: -3.0 osteoporosis, no significant change \par Proximal radius: -2.0 osteopenia, no significant change \par \par Bone mineral density March 26, 2018\par Indication assess response to medication\par Spine -3.6, osteoporosis, prior,-4.0\par Total hip -2.3, osteopenia\par Femoral neck -2.7, osteoporosis, no significant change\par Right total hip -2.3, osteopenia\par Right femoral neck -2.9, osteoporosis\par Left proximal radius -2.4, osteopenia, no prior\par \par \par i

## 2020-12-08 NOTE — HISTORY OF PRESENT ILLNESS
[Risedronate (Actonel)] : Risedronate [Prolia (Denosumab)] : Prolia (Denosumab) [Family History of Osteoporosis] : family history of osteoporosis [Regular Dental Follow-Up] : regular dental follow-up [FreeTextEntry1] : Long h/o low bone density. Spine bone density 2007; -3.0. Repeat 2012:-3.4. Repeat  2017 spine -4.0 femoral neck -2.9, total hip -2.3. Pt had previously declined treatment for osteoporosis. No h/o fx. No history of unusual or secondary causes for osteoporosis. Began Actonel monthly in 2017, took correctly. Had minor side effects, did not have with repeat doses. c/o cough after dose. Transitioned to Prolia April 2018. Taking correctly, tolerating well, no thigh pain. Last DDS 2/2020, normal and no dental work planned. No ONJ. No interval fx. Takes vitamin D 1,000 IU daily. Gets calcium in diet. \par Bone density May 2019 showed osteoporosis in spine -3.5 and femoral neck -3.0 , osteopenia of total hip -2.3 and distal radius -2.0; no significant change. \par Bone density 12/7/2020 shows improvement in spine -3.1, stable femoral neck -2.9 , stable total hip -2.3 and significant decrease in distal radius -3.0\par  [Disordered Eating] : no past or present history of disordered eating [Taking Steroids] : no past or present history of taking steroids [Kidney Stones] : no history of kidney stones [Family History of Hip Fracture] : no family history of hip fracture [Hyperparathyroidism] : no hyperparathyroidism [History of Radiation Therapy] : no history of radiation therapy [History of Blood Clots] : no history of blood clots [Previous Fragility Fracture] : no previous fragility fracture

## 2020-12-10 LAB — COLLAGEN CTX SERPL-MCNC: 52 PG/ML

## 2021-03-15 DIAGNOSIS — N39.0 URINARY TRACT INFECTION, SITE NOT SPECIFIED: ICD-10-CM

## 2021-05-25 ENCOUNTER — NON-APPOINTMENT (OUTPATIENT)
Age: 61
End: 2021-05-25

## 2021-06-14 ENCOUNTER — APPOINTMENT (OUTPATIENT)
Dept: ENDOCRINOLOGY | Facility: CLINIC | Age: 61
End: 2021-06-14
Payer: COMMERCIAL

## 2021-06-14 VITALS
DIASTOLIC BLOOD PRESSURE: 62 MMHG | HEIGHT: 58 IN | SYSTOLIC BLOOD PRESSURE: 98 MMHG | BODY MASS INDEX: 22.46 KG/M2 | OXYGEN SATURATION: 99 % | TEMPERATURE: 98.1 F | HEART RATE: 78 BPM | WEIGHT: 107 LBS

## 2021-06-14 DIAGNOSIS — I10 ESSENTIAL (PRIMARY) HYPERTENSION: ICD-10-CM

## 2021-06-14 PROCEDURE — 96401 CHEMO ANTI-NEOPL SQ/IM: CPT

## 2021-06-14 PROCEDURE — 99072 ADDL SUPL MATRL&STAF TM PHE: CPT

## 2021-06-14 PROCEDURE — 99214 OFFICE O/P EST MOD 30 MIN: CPT | Mod: 25

## 2021-06-14 RX ORDER — DENOSUMAB 60 MG/ML
60 INJECTION SUBCUTANEOUS
Qty: 1 | Refills: 0 | Status: COMPLETED | OUTPATIENT
Start: 2021-06-14

## 2021-06-14 RX ADMIN — DENOSUMAB 60 MG/ML: 60 INJECTION SUBCUTANEOUS at 00:00

## 2021-06-14 NOTE — REVIEW OF SYSTEMS
[Negative] : Endocrine [All other systems negative] : All other systems negative [Dysphagia] : no dysphagia [Neck Pain] : no neck pain [Dysphonia] : no dysphonia [Chest Pain] : no chest pain [Palpitations] : no palpitations [Shortness Of Breath] : no shortness of breath [Cough] : no cough [Polyuria] : no polyuria [Dysuria] : no dysuria [Dizziness] : no dizziness

## 2021-06-14 NOTE — HISTORY OF PRESENT ILLNESS
[Risedronate (Actonel)] : Risedronate [Prolia (Denosumab)] : Prolia (Denosumab) [Family History of Osteoporosis] : family history of osteoporosis [Regular Dental Follow-Up] : regular dental follow-up [FreeTextEntry1] : Long h/o low bone density. Spine bone density 2007; -3.0. Repeat 2012:-3.4. Repeat  2017 spine -4.0 femoral neck -2.9, total hip -2.3. Pt had previously declined treatment for osteoporosis. No h/o fx. No history of unusual or secondary causes for osteoporosis. Began Actonel monthly in 2017, took correctly. Had minor side effects, did not have with repeat doses. c/o cough after dose. Transitioned to Prolia April 2018. Taking correctly, tolerating well, no thigh pain. Last DDS 1/2021 normal and no dental work planned. No ONJ. No interval fx. Takes vitamin D 1,000 IU daily. Gets calcium in diet. \par Bone density May 2019 showed osteoporosis in spine -3.5 and femoral neck -3.0 , osteopenia of total hip -2.3 and distal radius -2.0; no significant change. \par Bone density 12/7/2020 shows improvement in spine -3.1, stable femoral neck -2.9 , stable total hip -2.3 and significant decrease in distal radius -3.0\par  [Disordered Eating] : no past or present history of disordered eating [Taking Steroids] : no past or present history of taking steroids [Kidney Stones] : no history of kidney stones [Family History of Hip Fracture] : no family history of hip fracture [Hyperparathyroidism] : no hyperparathyroidism [History of Radiation Therapy] : no history of radiation therapy [History of Blood Clots] : no history of blood clots [Previous Fragility Fracture] : no previous fragility fracture

## 2021-06-30 ENCOUNTER — RX CHANGE (OUTPATIENT)
Age: 61
End: 2021-06-30

## 2021-06-30 RX ORDER — ESTRADIOL 10 UG/1
10 TABLET VAGINAL
Qty: 24 | Refills: 2 | Status: DISCONTINUED | COMMUNITY
Start: 2017-08-14 | End: 2021-06-30

## 2021-10-11 ENCOUNTER — RX RENEWAL (OUTPATIENT)
Age: 61
End: 2021-10-11

## 2021-11-15 ENCOUNTER — APPOINTMENT (OUTPATIENT)
Dept: ANTEPARTUM | Facility: CLINIC | Age: 61
End: 2021-11-15

## 2021-11-15 ENCOUNTER — APPOINTMENT (OUTPATIENT)
Dept: MATERNAL FETAL MEDICINE | Facility: CLINIC | Age: 61
End: 2021-11-15
Payer: COMMERCIAL

## 2021-11-15 VITALS
BODY MASS INDEX: 22.04 KG/M2 | SYSTOLIC BLOOD PRESSURE: 150 MMHG | WEIGHT: 105 LBS | HEART RATE: 71 BPM | DIASTOLIC BLOOD PRESSURE: 80 MMHG | HEIGHT: 58 IN

## 2021-11-15 DIAGNOSIS — Z01.419 ENCOUNTER FOR GYNECOLOGICAL EXAMINATION (GENERAL) (ROUTINE) W/OUT ABNORMAL FINDINGS: ICD-10-CM

## 2021-11-15 PROCEDURE — 99396 PREV VISIT EST AGE 40-64: CPT

## 2021-11-15 RX ORDER — NITROFURANTOIN MACROCRYSTALS 100 MG/1
100 CAPSULE ORAL
Qty: 14 | Refills: 1 | Status: DISCONTINUED | COMMUNITY
Start: 2021-03-15 | End: 2021-11-15

## 2021-11-15 RX ORDER — OLMESARTAN MEDOXOMIL 40 MG/1
TABLET, FILM COATED ORAL
Refills: 0 | Status: DISCONTINUED | COMMUNITY
End: 2021-11-15

## 2021-11-15 RX ORDER — AMLODIPINE BESYLATE 2.5 MG/1
2.5 TABLET ORAL
Refills: 0 | Status: ACTIVE | COMMUNITY

## 2021-11-15 NOTE — DISCUSSION/SUMMARY
[FreeTextEntry1] : doing well.\par PAP.\par Mammo/Sono.\par DEXA next year.\par All questions answered.\par Routine follow up.

## 2021-11-15 NOTE — PHYSICAL EXAM
[Chaperone Declined] : Patient declined chaperone [Appropriately responsive] : appropriately responsive [Alert] : alert [No Acute Distress] : no acute distress [No Lymphadenopathy] : no lymphadenopathy [Regular Rate Rhythm] : regular rate rhythm [No Murmurs] : no murmurs [Clear to Auscultation B/L] : clear to auscultation bilaterally [Soft] : soft [Non-tender] : non-tender [Non-distended] : non-distended [No HSM] : No HSM [No Lesions] : no lesions [No Mass] : no mass [Oriented x3] : oriented x3 [Examination Of The Breasts] : a normal appearance [Breast Palpation Diffuse Fibrous Tissue Bilateral] : fibrocystic changes [No Masses] : no breast masses were palpable [Labia Majora] : normal [Labia Minora] : normal [Atrophy] : atrophy [Normal] : normal [Uterine Adnexae] : normal

## 2021-11-15 NOTE — HISTORY OF PRESENT ILLNESS
[FreeTextEntry1] : Patient with history of PMB.\par Also with Osteoporosis. [Patient reported mammogram was normal] : Patient reported mammogram was normal [Patient reported breast sonogram was normal] : Patient reported breast sonogram was normal [Patient reported PAP Smear was normal] : Patient reported PAP Smear was normal [Patient reported bone density results were abnormal] : Patient reported bone density results were abnormal [HIV test declined] : HIV test declined [Syphilis test declined] : Syphilis test declined [Gonorrhea test declined] : Gonorrhea test declined [Chlamydia test declined] : Chlamydia test declined [Trichomonas test declined] : Trichomonas test declined [HPV test offered] : HPV test offered [Hepatitis B test declined] : Hepatitis B test declined [Hepatitis C test declined] : Hepatitis C test declined [postmenopausal] : postmenopausal [N] : Patient reports normal menses [Y] : Positive pregnancy history [TextBox_4] : Doing well.\par No complaints. [Mammogramdate] : 11/20 [BreastSonogramDate] : 08/19 [PapSmeardate] : 10/20 [BoneDensityDate] : 10/20 [TextBox_37] : osteoporosis. [TextBox_7] : none [TextBox_11] : post menopausal. [TextBox_17] : fibrocystic. [Diffused Pain] : diffused pain [Hot Flashes] : hot flashes [Night Sweats] : night sweats [unknown] : Patient is unsure of the date of her LMP [Currently In Menopause] : currently in menopause [Post-Menopause, No Sxs] : post-menopausal, currently without symptoms [Menopause Age: ____] : age at menopause was [unfilled] [Currently Active] : currently active [Men] : men [Vaginal] : vaginal [No] : No [Patient refuses STI testing] : Patient refuses STI testing

## 2021-11-16 LAB — HPV HIGH+LOW RISK DNA PNL CVX: NOT DETECTED

## 2021-11-22 LAB — CYTOLOGY CVX/VAG DOC THIN PREP: ABNORMAL

## 2022-01-01 NOTE — PROCEDURE
Initial meeting with parents; baby presently in the nursery for a procedure.     Parents state baby nursed well after birth. Parents took two breastfeeding classes prior to baby's  birth.      Reviewed initial breastfeeding education. Discussed feeding frequency, monitoring diaper output, and encouraged plenty of skin to skin. Parents to call with next feeding.                 [FreeTextEntry1] : Bone mineral density: 05/06/2019 \par Indication: vs. 2018, assess response to medication \par Spine: -3.5 osteoporosis, no significant change \par Total hip: -2.3 osteopenia, no significant change \par Femoral neck: -3.0 osteoporosis, no significant change \par Proximal radius: -2.0 osteopenia, no significant change \par \par Bone mineral density March 26, 2018\par Indication assess response to medication\par Spine -3.6, osteoporosis, prior,-4.0\par Total hip -2.3, osteopenia\par Femoral neck -2.7, osteoporosis, no significant change\par Right total hip -2.3, osteopenia\par Right femoral neck -2.9, osteoporosis\par Left proximal radius -2.4, osteopenia, no prior\par \par \par i

## 2022-01-13 ENCOUNTER — APPOINTMENT (OUTPATIENT)
Dept: ENDOCRINOLOGY | Facility: CLINIC | Age: 62
End: 2022-01-13
Payer: COMMERCIAL

## 2022-01-13 VITALS
BODY MASS INDEX: 21.25 KG/M2 | SYSTOLIC BLOOD PRESSURE: 122 MMHG | HEART RATE: 88 BPM | DIASTOLIC BLOOD PRESSURE: 82 MMHG | TEMPERATURE: 98 F | OXYGEN SATURATION: 98 % | WEIGHT: 104 LBS | HEIGHT: 58.6 IN

## 2022-01-13 PROCEDURE — 99213 OFFICE O/P EST LOW 20 MIN: CPT | Mod: 25

## 2022-01-13 PROCEDURE — ZZZZZ: CPT

## 2022-01-13 PROCEDURE — 77080 DXA BONE DENSITY AXIAL: CPT

## 2022-01-13 PROCEDURE — 96401 CHEMO ANTI-NEOPL SQ/IM: CPT

## 2022-01-13 RX ORDER — AMLODIPINE BESYLATE 2.5 MG/1
2.5 TABLET ORAL
Qty: 30 | Refills: 0 | Status: DISCONTINUED | COMMUNITY
Start: 2021-09-28 | End: 2022-01-13

## 2022-01-13 RX ORDER — OLMESARTAN MEDOXOMIL AND HYDROCHLOROTHIAZIDE 20; 12.5 MG/1; MG/1
20-12.5 TABLET ORAL
Qty: 30 | Refills: 0 | Status: DISCONTINUED | COMMUNITY
Start: 2021-01-13 | End: 2022-01-13

## 2022-01-13 RX ORDER — DENOSUMAB 60 MG/ML
60 INJECTION SUBCUTANEOUS
Qty: 1 | Refills: 0 | Status: COMPLETED | OUTPATIENT
Start: 2022-01-13

## 2022-01-13 RX ADMIN — DENOSUMAB 60 MG/ML: 60 INJECTION SUBCUTANEOUS at 00:00

## 2022-01-13 NOTE — REVIEW OF SYSTEMS
[Dysphagia] : no dysphagia [Neck Pain] : no neck pain [Dysphonia] : no dysphonia [Chest Pain] : no chest pain [Palpitations] : no palpitations [Shortness Of Breath] : no shortness of breath [Cough] : no cough [Polyuria] : no polyuria [Dysuria] : no dysuria [Dizziness] : no dizziness [All other systems negative] : All other systems negative [Negative] : Heme/Lymph

## 2022-01-13 NOTE — PHYSICAL EXAM
[Alert] : alert [Well Nourished] : well nourished [Well Developed] : well developed [Normal Sclera/Conjunctiva] : normal sclera/conjunctiva [PERRL] : pupils equal, round and reactive to light [No Neck Mass] : no neck mass was observed [Supple] : the neck was supple [Clear to Auscultation] : lungs were clear to auscultation bilaterally [Normal to Percussion] : lungs were normal to percussion [Normal S1, S2] : normal S1 and S2 [Normal Rate] : heart rate was normal [Regular Rhythm] : with a regular rhythm [Normal Bowel Sounds] : normal bowel sounds [Not Tender] : non-tender [Soft] : abdomen soft [No Stigmata of Cushings Syndrome] : no stigmata of Cushings Syndrome [Normal Gait] : normal gait [No Motor Deficits] : the motor exam was normal [No Tremors] : no tremors [Oriented x3] : oriented to person, place, and time [Normal Affect] : the affect was normal [Normal Mood] : the mood was normal [Normal Anterior Cervical Nodes] : no anterior cervical lymphadenopathy

## 2022-01-13 NOTE — PROCEDURE
[FreeTextEntry1] : Bone mineral density: 01/13/2022 \par Indication: vs. 2020 prior test showed bone loss\par Spine: -3.0 osteoporosis, no significant change\par Total hip: -2.1 osteopenia, no significant change\par Femoral neck: -2.5 osteoporosis, +9.9%\par Proximal radius: -3.0 osteoporosis, no significant change\par \par Bone density 12/7/2020\par indication: vs 2019\par spine -3.1 osteoporosis, +5.1%\par total hip -2.3, osteopenia, no significant change\par femoral neck -2.9 osteoporosis, no significant change\par distal radius -3.0, osteoporosis, -9.8%\par \par Bone mineral density: 05/06/2019 \par Indication: vs. 2018, assess response to medication \par Spine: -3.5 osteoporosis, no significant change \par Total hip: -2.3 osteopenia, no significant change \par Femoral neck: -3.0 osteoporosis, no significant change \par Proximal radius: -2.0 osteopenia, no significant change \par \par Bone mineral density March 26, 2018\par Indication assess response to medication\par Spine -3.6, osteoporosis, prior,-4.0\par Total hip -2.3, osteopenia\par Femoral neck -2.7, osteoporosis, no significant change\par Right total hip -2.3, osteopenia\par Right femoral neck -2.9, osteoporosis\par Left proximal radius -2.4, osteopenia, no prior\par \par \par i

## 2022-01-13 NOTE — HISTORY OF PRESENT ILLNESS
[Prolia (Denosumab)] : Prolia (Denosumab) [Disordered Eating] : no past or present history of disordered eating [Taking Steroids] : no past or present history of taking steroids [Kidney Stones] : no history of kidney stones [Family History of Osteoporosis] : family history of osteoporosis [Family History of Hip Fracture] : no family history of hip fracture [Hyperparathyroidism] : no hyperparathyroidism [Regular Dental Follow-Up] : regular dental follow-up [History of Radiation Therapy] : no history of radiation therapy [History of Blood Clots] : no history of blood clots [Previous Fragility Fracture] : no previous fragility fracture [Risedronate (Actonel)] : Risedronate [Denosumab (Prolia)] : Denosumab [FreeTextEntry1] : No significant interval health changes. No interval surgery, hospitalizations, fractures, or change in medications.\par \par Long h/o low bone density. Spine bone density 2007; -3.0. Repeat 2012:-3.4. Repeat  2017 spine -4.0 femoral neck -2.9, total hip -2.3. Pt had previously declined treatment for osteoporosis. No h/o fx. No history of unusual or secondary causes for osteoporosis. Takes vitamin D 1,000 IU daily. Gets calcium in diet. Began Actonel monthly in 2017, took correctly. Had minor side effects, c/o cough after each dose. Transitioned to Prolia 4/2018. Tolerating well. No thigh pain, no interval fx. Normal Ca. Last DDS within past 6 months. No ONJ. Not planning major dental work. BMD 5/2019 indicates stability in all sites. BMD 12/2020 shows improvement in spine and stability at hip, however there is significant bone loss at prox radius. Unclear reason for significant bone loss at radius. CTx level low 12/2020.

## 2022-01-13 NOTE — END OF VISIT
[FreeTextEntry3] : I, Solo Chen, authored this note working as a medical scribe for Dr. Cross.  01/13/2022. 12:30PM. This note was authored by the medical scribe for me. I have reviewed, edited, and revised the note as needed. I am in agreement with the exam findings, imaging findings, and treatment plan.  Kleber Cross MD

## 2022-01-13 NOTE — ASSESSMENT
[Denosumab Therapy] : Risks  and benefits of denosumab therapy were discussed with the patient including eczema, cellulitis, osteonecrosis of the jaw and atypical femur fractures [FreeTextEntry1] : 61 year-old female with severe osteoporosis \par \par Pt presented with progressive bone loss at the spine. Initially began Actonel. Tolerated well except c/o cough after each dose. Patient advised for an increased risk of future fx. Options for medical therapy discussed in detail. Pt requested Prolia, started 4/2018. Tolerating well. No thigh pain, no interval fx. Normal Ca. No ONJ. BMD 5/2019 indicates stability in all sites. BMD 12/2020 shows improvement in spine and stability at hip, however there is significant bone loss at prox radius. Unclear reason for significant bone loss at radius. CTx level low 12/2020.  BMD 1/2022 indicates stable osteoporosis in spine and proximal radius, significantly improved osteoporosis in femoral neck, and stable osteopenia in total hip. BMD results reviewed w/ pt. Continue Prolia, from [facility].\par \par Request labs sent out.\par \par F/u in 6 months

## 2022-01-14 LAB
25(OH)D3 SERPL-MCNC: 56.8 NG/ML
ALBUMIN SERPL ELPH-MCNC: 4.8 G/DL
ALP BLD-CCNC: 64 U/L
ALT SERPL-CCNC: 16 U/L
ANION GAP SERPL CALC-SCNC: 17 MMOL/L
AST SERPL-CCNC: 15 U/L
BILIRUB SERPL-MCNC: 0.3 MG/DL
BUN SERPL-MCNC: 11 MG/DL
CALCIUM SERPL-MCNC: 9.8 MG/DL
CALCIUM SERPL-MCNC: 9.8 MG/DL
CHLORIDE SERPL-SCNC: 100 MMOL/L
CO2 SERPL-SCNC: 23 MMOL/L
CREAT SERPL-MCNC: 0.68 MG/DL
GLUCOSE SERPL-MCNC: 68 MG/DL
PARATHYROID HORMONE INTACT: 26 PG/ML
PHOSPHATE SERPL-MCNC: 3.6 MG/DL
POTASSIUM SERPL-SCNC: 4.1 MMOL/L
PROT SERPL-MCNC: 7.1 G/DL
SODIUM SERPL-SCNC: 140 MMOL/L
TSH SERPL-ACNC: 1.16 UIU/ML

## 2022-03-17 ENCOUNTER — RX RENEWAL (OUTPATIENT)
Age: 62
End: 2022-03-17

## 2022-03-17 RX ORDER — ESTRADIOL 10 UG/1
10 TABLET, FILM COATED VAGINAL
Qty: 24 | Refills: 2 | Status: ACTIVE | COMMUNITY
Start: 2021-06-30 | End: 1900-01-01

## 2022-06-08 NOTE — END OF VISIT
[Time Spent: ___ minutes] : I have spent [unfilled] minutes of time on the encounter. [>50% of the face to face encounter time was spent on counseling and/or coordination of care for ___] : Greater than 50% of the face to face encounter time was spent on counseling and/or coordination of care for [unfilled] Post-Care Instructions: I reviewed with the patient in detail post-care instructions. Patient is to wear sunprotection, and avoid picking at any of the treated lesions. Pt may apply Vaseline to crusted or scabbing areas. Render Post-Care Instructions In Note?: no Detail Level: Detailed Consent: The patient's consent was obtained including but not limited to risks of crusting, scabbing, blistering, scarring, darker or lighter pigmentary change, recurrence, incomplete removal and infection. Show Applicator Variable?: Yes Duration Of Freeze Thaw-Cycle (Seconds): 0

## 2022-07-08 ENCOUNTER — TRANSCRIPTION ENCOUNTER (OUTPATIENT)
Age: 62
End: 2022-07-08

## 2022-07-08 RX ORDER — NITROFURANTOIN (MONOHYDRATE/MACROCRYSTALS) 25; 75 MG/1; MG/1
100 CAPSULE ORAL
Qty: 60 | Refills: 2 | Status: ACTIVE | COMMUNITY
Start: 2022-07-08 | End: 1900-01-01

## 2022-07-14 ENCOUNTER — APPOINTMENT (OUTPATIENT)
Dept: ENDOCRINOLOGY | Facility: CLINIC | Age: 62
End: 2022-07-14

## 2022-07-14 PROCEDURE — 96401 CHEMO ANTI-NEOPL SQ/IM: CPT

## 2022-07-14 RX ORDER — DENOSUMAB 60 MG/ML
60 INJECTION SUBCUTANEOUS
Qty: 1 | Refills: 0 | Status: COMPLETED | OUTPATIENT
Start: 2022-07-13

## 2022-10-27 ENCOUNTER — NON-APPOINTMENT (OUTPATIENT)
Age: 62
End: 2022-10-27

## 2023-01-18 ENCOUNTER — APPOINTMENT (OUTPATIENT)
Dept: ENDOCRINOLOGY | Facility: CLINIC | Age: 63
End: 2023-01-18
Payer: COMMERCIAL

## 2023-01-18 VITALS
DIASTOLIC BLOOD PRESSURE: 78 MMHG | OXYGEN SATURATION: 98 % | WEIGHT: 105 LBS | BODY MASS INDEX: 21.45 KG/M2 | HEART RATE: 73 BPM | HEIGHT: 58.5 IN | SYSTOLIC BLOOD PRESSURE: 124 MMHG

## 2023-01-18 PROCEDURE — 77080 DXA BONE DENSITY AXIAL: CPT

## 2023-01-18 PROCEDURE — 99213 OFFICE O/P EST LOW 20 MIN: CPT | Mod: 25

## 2023-01-18 PROCEDURE — ZZZZZ: CPT

## 2023-01-18 PROCEDURE — 96401 CHEMO ANTI-NEOPL SQ/IM: CPT

## 2023-01-18 RX ORDER — DENOSUMAB 60 MG/ML
60 INJECTION SUBCUTANEOUS
Qty: 1 | Refills: 0 | Status: COMPLETED | OUTPATIENT
Start: 2023-01-18

## 2023-01-18 RX ADMIN — DENOSUMAB 60 MG/ML: 60 INJECTION SUBCUTANEOUS at 00:00

## 2023-01-18 NOTE — HISTORY OF PRESENT ILLNESS
[Risedronate (Actonel)] : Risedronate [Denosumab (Prolia)] : Denosumab [FreeTextEntry1] : Patient returns for a follow up visit for osteoporosis. Since the last visit pt has no  significant interval health changes. No interval surgery, hospitalizations, fractures, or change in medications.\par \par Long h/o low bone density. Spine bone density 2007; -3.0. Repeat 2012:-3.4. Repeat  2017 spine -4.0 femoral neck -2.9, total hip -2.3. Pt had previously declined treatment for osteoporosis. No h/o fx. No history of unusual or secondary causes for osteoporosis. Takes vitamin D 1,000 IU daily. Gets calcium in diet. Began Actonel monthly in 2017, took correctly. Had minor side effects, c/o cough after each dose. Transitioned to Prolia 4/2018. Tolerating well. No thigh pain, no interval fx. Normal Ca. Last DDS within past 6 months. No ONJ. Not planning major dental work. BMD 5/2019 indicates stability in all sites. BMD 12/2020 shows improvement in spine and stability at hip, however there is significant bone loss at prox radius. Unclear reason for significant bone loss at radius. CTx level low 12/2020.

## 2023-01-18 NOTE — END OF VISIT
[FreeTextEntry3] : This note was written by Sharon Francisco on ( January 18, 2023 ) acting as a medical scribe for Dr. Cross  This note was authored by the medical scribe for me. I have reviewed, edited, and revised the note as needed. I am in agreement with the exam findings, imaging findings, and treatment plan.  Kleber Cross MD

## 2023-01-18 NOTE — PHYSICAL EXAM
[Alert] : alert [Well Nourished] : well nourished [Well Developed] : well developed [Normal Sclera/Conjunctiva] : normal sclera/conjunctiva [PERRL] : pupils equal, round and reactive to light [No Neck Mass] : no neck mass was observed [Supple] : the neck was supple [Clear to Auscultation] : lungs were clear to auscultation bilaterally [Normal to Percussion] : lungs were normal to percussion [Normal S1, S2] : normal S1 and S2 [Normal Rate] : heart rate was normal [Regular Rhythm] : with a regular rhythm [Normal Bowel Sounds] : normal bowel sounds [Not Tender] : non-tender [Soft] : abdomen soft [Normal Anterior Cervical Nodes] : no anterior cervical lymphadenopathy [No Stigmata of Cushings Syndrome] : no stigmata of Cushings Syndrome [Normal Gait] : normal gait [No Motor Deficits] : the motor exam was normal [No Tremors] : no tremors [Oriented x3] : oriented to person, place, and time [Normal Affect] : the affect was normal [Normal Mood] : the mood was normal

## 2023-01-18 NOTE — PROCEDURE
[FreeTextEntry1] : Bone Density 1/18/2023 \par Indication vs 2022 Asses response to medication \par Spine -2.8 osteoporosis +3.0% \par Total Hip -2.2 osteopenia , no significant change \par Femoral Neck -2.6 osteopenia , no significant change \par Proximal Radius -2.9 osteoporosis \par \par Bone mineral density: 01/13/2022 \par Indication: vs. 2020 prior test showed bone loss\par Spine: -3.0 osteoporosis, no significant change\par Total hip: -2.1 osteopenia, no significant change\par Femoral neck: -2.5 osteoporosis, +9.9%\par Proximal radius: -3.0 osteoporosis, no significant change\par \par Bone density 12/7/2020\par indication: vs 2019\par spine -3.1 osteoporosis, +5.1%\par total hip -2.3, osteopenia, no significant change\par femoral neck -2.9 osteoporosis, no significant change\par distal radius -3.0, osteoporosis, -9.8%\par \par Bone mineral density: 05/06/2019 \par Indication: vs. 2018, assess response to medication \par Spine: -3.5 osteoporosis, no significant change \par Total hip: -2.3 osteopenia, no significant change \par Femoral neck: -3.0 osteoporosis, no significant change \par Proximal radius: -2.0 osteopenia, no significant change \par \par Bone mineral density March 26, 2018\par Indication assess response to medication\par Spine -3.6, osteoporosis, prior,-4.0\par Total hip -2.3, osteopenia\par Femoral neck -2.7, osteoporosis, no significant change\par Right total hip -2.3, osteopenia\par Right femoral neck -2.9, osteoporosis\par Left proximal radius -2.4, osteopenia, no prior\par \par \par i

## 2023-01-18 NOTE — ASSESSMENT
[Denosumab Therapy] : Risks  and benefits of denosumab therapy were discussed with the patient including eczema, cellulitis, osteonecrosis of the jaw and atypical femur fractures [FreeTextEntry1] : 62 year-old female with severe osteoporosis \par \par Pt presented with progressive bone loss at the spine. Initially began Actonel. Tolerated well except c/o cough after each dose. Patient advised for an increased risk of future fx. Options for medical therapy discussed in detail. Pt requested Prolia, started 4/2018. Tolerating well. No thigh pain, no interval fx. Normal Ca. No ONJ. BMD 5/2019 indicates stability in all sites. BMD 12/2020 shows improvement in spine and stability at hip, however there is significant bone loss at prox radius. Unclear reason for significant bone loss at radius. CTx level low 12/2020.  BMD 1/2022 indicates stable osteoporosis in spine and proximal radius, significantly improved osteoporosis in femoral neck, and stable osteopenia in total hip. BMD 1/2023 shows that overall bone density is trending in the right direction over time. BMD results reviewed w/ pt. Continue [facility], from [facility].\par \par Request labs sent out.\par \par F/u in 6 months

## 2023-01-21 LAB
ALBUMIN SERPL ELPH-MCNC: 4.8 G/DL
ALP BLD-CCNC: 65 U/L
ALT SERPL-CCNC: 13 U/L
ANION GAP SERPL CALC-SCNC: 13 MMOL/L
AST SERPL-CCNC: 16 U/L
BILIRUB SERPL-MCNC: 0.3 MG/DL
BUN SERPL-MCNC: 11 MG/DL
CALCIUM SERPL-MCNC: 9.7 MG/DL
CHLORIDE SERPL-SCNC: 102 MMOL/L
CO2 SERPL-SCNC: 25 MMOL/L
CREAT SERPL-MCNC: 0.62 MG/DL
EGFR: 99 ML/MIN/1.73M2
GLUCOSE SERPL-MCNC: 83 MG/DL
POTASSIUM SERPL-SCNC: 4 MMOL/L
PROT SERPL-MCNC: 7.1 G/DL
SODIUM SERPL-SCNC: 140 MMOL/L

## 2023-07-19 ENCOUNTER — APPOINTMENT (OUTPATIENT)
Dept: ENDOCRINOLOGY | Facility: CLINIC | Age: 63
End: 2023-07-19
Payer: MEDICARE

## 2023-07-19 PROCEDURE — 96372 THER/PROPH/DIAG INJ SC/IM: CPT

## 2023-07-19 RX ADMIN — DENOSUMAB 60 MG/ML: 60 INJECTION SUBCUTANEOUS at 00:00

## 2023-07-22 RX ORDER — DENOSUMAB 60 MG/ML
60 INJECTION SUBCUTANEOUS
Qty: 1 | Refills: 0 | Status: COMPLETED | OUTPATIENT
Start: 2023-07-19

## 2024-01-17 RX ORDER — DENOSUMAB 60 MG/ML
60 INJECTION SUBCUTANEOUS
Qty: 1 | Refills: 1 | Status: ACTIVE | COMMUNITY
Start: 2019-10-31

## 2024-01-29 ENCOUNTER — APPOINTMENT (OUTPATIENT)
Dept: ENDOCRINOLOGY | Facility: CLINIC | Age: 64
End: 2024-01-29
Payer: MEDICARE

## 2024-01-29 VITALS — DIASTOLIC BLOOD PRESSURE: 74 MMHG | OXYGEN SATURATION: 98 % | SYSTOLIC BLOOD PRESSURE: 126 MMHG | HEART RATE: 72 BPM

## 2024-01-29 DIAGNOSIS — M81.0 AGE-RELATED OSTEOPOROSIS W/OUT CURRENT PATHOLOGICAL FRACTURE: ICD-10-CM

## 2024-01-29 PROCEDURE — 96372 THER/PROPH/DIAG INJ SC/IM: CPT

## 2024-01-29 PROCEDURE — 99213 OFFICE O/P EST LOW 20 MIN: CPT | Mod: 25

## 2024-01-29 RX ORDER — DENOSUMAB 60 MG/ML
60 INJECTION SUBCUTANEOUS
Qty: 1 | Refills: 0 | Status: COMPLETED | OUTPATIENT
Start: 2024-01-29

## 2024-01-29 RX ADMIN — DENOSUMAB 60 MG/ML: 60 INJECTION SUBCUTANEOUS at 00:00

## 2024-01-30 PROBLEM — M81.0 OSTEOPOROSIS: Status: ACTIVE | Noted: 2017-03-24

## 2024-01-30 NOTE — ASSESSMENT
[Denosumab Therapy] : Risks  and benefits of denosumab therapy were discussed with the patient including eczema, cellulitis, osteonecrosis of the jaw and atypical femur fractures [FreeTextEntry1] : 63 year-old female with severe osteoporosis   Pt presented with progressive bone loss at the spine. Initially began Actonel. Tolerated well except c/o cough after each dose. Patient advised for an increased risk of future fx. Options for medical therapy discussed in detail. Pt requested Prolia, started 4/2018. Tolerating well. No thigh pain, no interval fx. Normal Ca. No ONJ. BMD 5/2019 indicates stability in all sites. BMD 12/2020 shows improvement in spine and stability at hip, however there is significant bone loss at prox radius. Unclear reason for significant bone loss at radius. CTx level low 12/2020. BMD 01/2022 indicates stable osteoporosis in spine and proximal radius, significantly improved osteoporosis in femoral neck, and stable osteopenia in total hip. BMD 1/2023 shows that overall bone density is trending in the right direction over time. BMD results reviewed w/ pt. Continue Prolia from [facility].  F/u in 6 months for Prolia and repeat BMD next year.

## 2024-01-30 NOTE — END OF VISIT
[FreeTextEntry3] : I, Juni Cook, authored this note working as a medical scribe for Dr. Cross.  01/29/2024.  This note was authored by the medical scribe for me. I have reviewed, edited, and revised the note as needed. I am in agreement with the exam findings, imaging findings, and treatment plan.  Kleber Cross MD

## 2024-01-30 NOTE — PROCEDURE
If you are a smoker, it is important for your health to stop smoking. Please be aware that second hand smoke is also harmful. [FreeTextEntry1] : Bone Density 1/18/2023 \par  Indication vs 2022 Asses response to medication \par  Spine -2.8 osteoporosis +3.0% \par  Total Hip -2.2 osteopenia , no significant change \par  Femoral Neck -2.6 osteopenia , no significant change \par  Proximal Radius -2.9 osteoporosis \par  \par  Bone mineral density: 01/13/2022 \par  Indication: vs. 2020 prior test showed bone loss\par  Spine: -3.0 osteoporosis, no significant change\par  Total hip: -2.1 osteopenia, no significant change\par  Femoral neck: -2.5 osteoporosis, +9.9%\par  Proximal radius: -3.0 osteoporosis, no significant change\par  \par  Bone density 12/7/2020\par  indication: vs 2019\par  spine -3.1 osteoporosis, +5.1%\par  total hip -2.3, osteopenia, no significant change\par  femoral neck -2.9 osteoporosis, no significant change\par  distal radius -3.0, osteoporosis, -9.8%\par  \par  Bone mineral density: 05/06/2019 \par  Indication: vs. 2018, assess response to medication \par  Spine: -3.5 osteoporosis, no significant change \par  Total hip: -2.3 osteopenia, no significant change \par  Femoral neck: -3.0 osteoporosis, no significant change \par  Proximal radius: -2.0 osteopenia, no significant change \par  \par  Bone mineral density March 26, 2018\par  Indication assess response to medication\par  Spine -3.6, osteoporosis, prior,-4.0\par  Total hip -2.3, osteopenia\par  Femoral neck -2.7, osteoporosis, no significant change\par  Right total hip -2.3, osteopenia\par  Right femoral neck -2.9, osteoporosis\par  Left proximal radius -2.4, osteopenia, no prior\par  \par  \par  i

## 2024-01-30 NOTE — HISTORY OF PRESENT ILLNESS
[Risedronate (Actonel)] : Risedronate [Denosumab (Prolia)] : Denosumab [FreeTextEntry1] :  Pt returns for a follow-up visit for osteoporosis. No interval health changes. No major surgeries, hospitalizations, fractures or changes in medication. Up to date with dentist. No major dental work planned.  Long h/o low bone density. Spine bone density 2007; -3.0. Repeat 2012:-3.4. Repeat  2017 spine -4.0 femoral neck -2.9, total hip -2.3. Pt had previously declined treatment for osteoporosis. No h/o fx. No history of unusual or secondary causes for osteoporosis. Takes vitamin D 1,000 IU daily. Gets calcium in diet. Began Actonel monthly in 2017, took correctly. Had minor side effects, c/o cough after each dose. Transitioned to Prolia 4/2018. Tolerating well. No thigh pain, no interval fx. Normal Ca. Last DDS within past 6 months. No ONJ. Not planning major dental work. BMD 5/2019 indicates stability in all sites. BMD 12/2020 shows improvement in spine and stability at hip, however there is significant bone loss at prox radius. Unclear reason for significant bone loss at radius. CTx level low 12/2020.

## 2024-06-25 ENCOUNTER — NON-APPOINTMENT (OUTPATIENT)
Age: 64
End: 2024-06-25

## 2024-07-30 ENCOUNTER — APPOINTMENT (OUTPATIENT)
Dept: ENDOCRINOLOGY | Facility: CLINIC | Age: 64
End: 2024-07-30
Payer: MEDICARE

## 2024-07-30 DIAGNOSIS — M81.0 AGE-RELATED OSTEOPOROSIS W/OUT CURRENT PATHOLOGICAL FRACTURE: ICD-10-CM

## 2024-07-30 PROCEDURE — 96372 THER/PROPH/DIAG INJ SC/IM: CPT

## 2024-07-30 RX ADMIN — DENOSUMAB 60 MG/ML: 60 INJECTION SUBCUTANEOUS at 00:00

## 2024-07-31 RX ORDER — DENOSUMAB 60 MG/ML
60 INJECTION SUBCUTANEOUS
Qty: 1 | Refills: 0 | Status: COMPLETED | OUTPATIENT
Start: 2024-07-30

## 2024-12-23 ENCOUNTER — RX RENEWAL (OUTPATIENT)
Age: 64
End: 2024-12-23

## 2025-02-11 ENCOUNTER — APPOINTMENT (OUTPATIENT)
Dept: ENDOCRINOLOGY | Facility: CLINIC | Age: 65
End: 2025-02-11
Payer: MEDICARE

## 2025-02-11 VITALS
OXYGEN SATURATION: 98 % | HEIGHT: 58.5 IN | WEIGHT: 106 LBS | SYSTOLIC BLOOD PRESSURE: 128 MMHG | DIASTOLIC BLOOD PRESSURE: 68 MMHG | HEART RATE: 85 BPM | BODY MASS INDEX: 21.66 KG/M2

## 2025-02-11 DIAGNOSIS — E78.00 PURE HYPERCHOLESTEROLEMIA, UNSPECIFIED: ICD-10-CM

## 2025-02-11 DIAGNOSIS — M81.0 AGE-RELATED OSTEOPOROSIS W/OUT CURRENT PATHOLOGICAL FRACTURE: ICD-10-CM

## 2025-02-11 PROCEDURE — 99213 OFFICE O/P EST LOW 20 MIN: CPT | Mod: 25

## 2025-02-11 PROCEDURE — 96372 THER/PROPH/DIAG INJ SC/IM: CPT

## 2025-02-11 PROCEDURE — ZZZZZ: CPT

## 2025-02-11 PROCEDURE — 77080 DXA BONE DENSITY AXIAL: CPT

## 2025-02-11 RX ORDER — ATORVASTATIN CALCIUM 10 MG/1
10 TABLET, FILM COATED ORAL
Refills: 0 | Status: ACTIVE | COMMUNITY

## 2025-02-11 RX ORDER — DENOSUMAB 60 MG/ML
60 INJECTION SUBCUTANEOUS
Qty: 1 | Refills: 0 | Status: COMPLETED | OUTPATIENT
Start: 2025-02-11

## 2025-02-11 RX ADMIN — DENOSUMAB 60 MG/ML: 60 INJECTION SUBCUTANEOUS at 00:00

## 2025-02-12 ENCOUNTER — TRANSCRIPTION ENCOUNTER (OUTPATIENT)
Age: 65
End: 2025-02-12

## 2025-02-12 LAB
ALBUMIN SERPL ELPH-MCNC: 4.8 G/DL
ALP BLD-CCNC: 64 U/L
ALT SERPL-CCNC: 15 U/L
ANION GAP SERPL CALC-SCNC: 13 MMOL/L
AST SERPL-CCNC: 20 U/L
BILIRUB SERPL-MCNC: 0.4 MG/DL
BUN SERPL-MCNC: 13 MG/DL
CALCIUM SERPL-MCNC: 9.9 MG/DL
CHLORIDE SERPL-SCNC: 102 MMOL/L
CHOLEST SERPL-MCNC: 165 MG/DL
CO2 SERPL-SCNC: 26 MMOL/L
CREAT SERPL-MCNC: 0.61 MG/DL
EGFR: 99 ML/MIN/1.73M2
GLUCOSE SERPL-MCNC: 88 MG/DL
HDLC SERPL-MCNC: 82 MG/DL
LDLC SERPL CALC-MCNC: 65 MG/DL
NONHDLC SERPL-MCNC: 84 MG/DL
POTASSIUM SERPL-SCNC: 4.9 MMOL/L
PROT SERPL-MCNC: 7.1 G/DL
SODIUM SERPL-SCNC: 141 MMOL/L
TRIGL SERPL-MCNC: 103 MG/DL

## 2025-07-07 ENCOUNTER — RX RENEWAL (OUTPATIENT)
Age: 65
End: 2025-07-07

## 2025-08-11 ENCOUNTER — NON-APPOINTMENT (OUTPATIENT)
Age: 65
End: 2025-08-11

## 2025-08-12 ENCOUNTER — APPOINTMENT (OUTPATIENT)
Dept: ENDOCRINOLOGY | Facility: CLINIC | Age: 65
End: 2025-08-12
Payer: MEDICARE

## 2025-08-12 DIAGNOSIS — M81.0 AGE-RELATED OSTEOPOROSIS W/OUT CURRENT PATHOLOGICAL FRACTURE: ICD-10-CM

## 2025-08-12 PROCEDURE — 96372 THER/PROPH/DIAG INJ SC/IM: CPT

## 2025-08-12 RX ORDER — DENOSUMAB 60 MG/ML
60 INJECTION SUBCUTANEOUS
Qty: 1 | Refills: 0 | Status: COMPLETED | OUTPATIENT
Start: 2025-08-08

## 2025-09-08 ENCOUNTER — TRANSCRIPTION ENCOUNTER (OUTPATIENT)
Age: 65
End: 2025-09-08